# Patient Record
Sex: FEMALE | Race: AMERICAN INDIAN OR ALASKA NATIVE
[De-identification: names, ages, dates, MRNs, and addresses within clinical notes are randomized per-mention and may not be internally consistent; named-entity substitution may affect disease eponyms.]

---
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
## 2018-04-10 ENCOUNTER — HOSPITAL ENCOUNTER (EMERGENCY)
Dept: HOSPITAL 5 - ED | Age: 18
LOS: 1 days | Discharge: HOME | End: 2018-04-11
Payer: MEDICAID

## 2018-04-10 DIAGNOSIS — N76.0: ICD-10-CM

## 2018-04-10 DIAGNOSIS — E86.0: Primary | ICD-10-CM

## 2018-04-10 PROCEDURE — 85025 COMPLETE CBC W/AUTO DIFF WBC: CPT

## 2018-04-10 PROCEDURE — 36415 COLL VENOUS BLD VENIPUNCTURE: CPT

## 2018-04-10 PROCEDURE — 99283 EMERGENCY DEPT VISIT LOW MDM: CPT

## 2018-04-10 PROCEDURE — 96372 THER/PROPH/DIAG INJ SC/IM: CPT

## 2018-04-10 PROCEDURE — 81001 URINALYSIS AUTO W/SCOPE: CPT

## 2018-04-10 PROCEDURE — 84703 CHORIONIC GONADOTROPIN ASSAY: CPT

## 2018-04-10 PROCEDURE — 80053 COMPREHEN METABOLIC PANEL: CPT

## 2018-04-11 VITALS — DIASTOLIC BLOOD PRESSURE: 47 MMHG | SYSTOLIC BLOOD PRESSURE: 125 MMHG

## 2018-04-11 LAB
ALBUMIN SERPL-MCNC: 4.3 G/DL (ref 3.9–5)
ALT SERPL-CCNC: 8 UNITS/L (ref 7–56)
BASOPHILS # (AUTO): 0 K/MM3 (ref 0–0.1)
BASOPHILS NFR BLD AUTO: 0.5 % (ref 0–1.8)
BILIRUB UR QL STRIP: (no result)
BLOOD UR QL VISUAL: (no result)
BUN SERPL-MCNC: 11 MG/DL (ref 7–17)
BUN/CREAT SERPL: 18 %
CALCIUM SERPL-MCNC: 9.2 MG/DL (ref 8.4–10.2)
EOSINOPHIL # BLD AUTO: 0.1 K/MM3 (ref 0–0.4)
EOSINOPHIL NFR BLD AUTO: 0.8 % (ref 0–4.3)
HCT VFR BLD CALC: 40.9 % (ref 30.3–42.9)
HEMOLYSIS INDEX: 4
HGB BLD-MCNC: 13.6 GM/DL (ref 10.1–14.3)
LYMPHOCYTES # BLD AUTO: 1.5 K/MM3 (ref 1.2–5.4)
LYMPHOCYTES NFR BLD AUTO: 19.2 % (ref 13.4–35)
MCH RBC QN AUTO: 29 PG (ref 28–32)
MCHC RBC AUTO-ENTMCNC: 33 % (ref 30–34)
MCV RBC AUTO: 88 FL (ref 79–97)
MONOCYTES # (AUTO): 0.7 K/MM3 (ref 0–0.8)
MONOCYTES % (AUTO): 8.9 % (ref 0–7.3)
PH UR STRIP: 6 [PH] (ref 5–7)
PLATELET # BLD: 219 K/MM3 (ref 140–440)
PROT UR STRIP-MCNC: (no result) MG/DL
RBC # BLD AUTO: 4.65 M/MM3 (ref 3.65–5.03)
RBC #/AREA URNS HPF: 3 /HPF (ref 0–6)
UROBILINOGEN UR-MCNC: < 2 MG/DL (ref ?–2)
WBC #/AREA URNS HPF: 1 /HPF (ref 0–6)

## 2018-04-11 NOTE — EMERGENCY DEPARTMENT REPORT
HPI





- General


Chief Complaint: Abdominal Pain


Time Seen by Provider: 04/11/18 01:40





- HPI


HPI: 








The patient is a 17-year-old female presents for evaluation of abdominal pain.  

The patient reports lower abdominal pain for 2-3 months, worsened for the past 

week, moderate in severity, crampy in quality, exacerbated with position 

changes.  She admits to chronic vaginal discharge and vaginal irritation for 

many months. The patient denies fever, chills, night sweats, nausea, vomiting, 

diarrhea, blood in the stool, dark tarry stool, dysuria, hematuria, flank pain, 

vaginal bleeding, inability to pass flatus. 








ED Past Medical Hx





- Past Medical History


Previous Medical History?: Yes





- Surgical History


Past Surgical History?: No





- Social History


Smoking Status: Never Smoker


Substance Use Type: None





- Medications


Home Medications: 


 Home Medications











 Medication  Instructions  Recorded  Confirmed  Last Taken  Type


 


metroNIDAZOLE [Flagyl] 500 mg PO Q12HR #14 tab 06/22/17  Unknown Rx


 


Ibuprofen [Motrin] 800 mg PO Q8HR PRN #15 tablet 04/11/18  Unknown Rx


 


metroNIDAZOLE [Flagyl] 500 mg PO Q12HR #14 tab 04/11/18  Unknown Rx














ED Review of Systems


ROS: 


Stated complaint: ABD PAIN


Other details as noted in HPI








Constitutional: denies: fever


ENT: denies: throat or neck pain


Respiratory: denies: cough, shortness of breath


Cardiovascular: denies: chest pain


Endocrine: denies unexplained weight loss or gain


Gastrointestinal: reports abdominal pain denies: nausea


Genitourinary: reports vaginal discharge denies: dysuria


Musculoskeletal: denies: leg swelling


Skin: denies: rash


Neurological: denies: headache


Hematological/Lymphatic: denies: easy bleeding or easy bruising  


Psych: denies sadness or hopelessness








Physical Exam





- Physical Exam


Vital Signs: 


 Vital Signs











  04/10/18 04/11/18 04/11/18





  23:39 00:06 00:34


 


Temperature 98.8 F 98.8 F 


 


Pulse Rate 98 H 98 H 


 


Respiratory 18 18 





Rate   


 


Blood Pressure 140/84 140/84 123/78


 


O2 Sat by Pulse 99 99 





Oximetry   














  04/11/18 04/11/18 04/11/18





  00:45 01:00 01:16


 


Temperature 98.9 F  


 


Pulse Rate   


 


Respiratory   





Rate   


 


Blood Pressure 123/78 123/78 110/79


 


O2 Sat by Pulse 100 100 98





Oximetry   














  04/11/18 04/11/18 04/11/18





  01:30 01:46 02:00


 


Temperature   


 


Pulse Rate   


 


Respiratory   





Rate   


 


Blood Pressure 121/67 121/67 115/75


 


O2 Sat by Pulse 100 98 98





Oximetry   














  04/11/18 04/11/18 04/11/18





  02:16 02:30 02:46


 


Temperature   


 


Pulse Rate   


 


Respiratory   





Rate   


 


Blood Pressure 115/75 123/73 123/73


 


O2 Sat by Pulse 98 96 84





Oximetry   














  04/11/18





  03:00


 


Temperature 


 


Pulse Rate 


 


Respiratory 





Rate 


 


Blood Pressure 125/47


 


O2 Sat by Pulse 98





Oximetry 











Physical Exam: 








General: well-nourished, well-developed, no acute distress


Head: Normocephalic, atraumatic


Eyes: normal sclera


ENT: Mucous membranes are pale and dry


Neck: trachea midline, neck supple, No neck stiffness, no cervical adenopathy


Respiratory: Breath sounds equal bilaterally, no wheezing, rales, or rhonchi


Cardio: S1 and S2 present, no murmurs, rubs, gallops, capillary refill is 

delayed


Abdomen: Normoactive bowel sounds, soft abdomen, suprapubic and periumbilical 

tenderness to palpation present, no rigidity, no guarding or rebound tenderness


Chest WALL/Back: No tenderness to palpation of the chest wall, no CVA 

tenderness with percussion


Musc: No pitting edema


Skin: No rash


Neuro: no facial drooping, normal speech


Psych: Normal affect








ED Course


 Vital Signs











  04/10/18 04/11/18 04/11/18





  23:39 00:06 00:34


 


Temperature 98.8 F 98.8 F 


 


Pulse Rate 98 H 98 H 


 


Respiratory 18 18 





Rate   


 


Blood Pressure 140/84 140/84 123/78


 


O2 Sat by Pulse 99 99 





Oximetry   














  04/11/18 04/11/18 04/11/18





  00:45 01:00 01:16


 


Temperature 98.9 F  


 


Pulse Rate   


 


Respiratory   





Rate   


 


Blood Pressure 123/78 123/78 110/79


 


O2 Sat by Pulse 100 100 98





Oximetry   














  04/11/18 04/11/18 04/11/18





  01:30 01:46 02:00


 


Temperature   


 


Pulse Rate   


 


Respiratory   





Rate   


 


Blood Pressure 121/67 121/67 115/75


 


O2 Sat by Pulse 100 98 98





Oximetry   














  04/11/18 04/11/18 04/11/18





  02:16 02:30 02:46


 


Temperature   


 


Pulse Rate   


 


Respiratory   





Rate   


 


Blood Pressure 115/75 123/73 123/73


 


O2 Sat by Pulse 98 96 84





Oximetry   














  04/11/18





  03:00


 


Temperature 


 


Pulse Rate 


 


Respiratory 





Rate 


 


Blood Pressure 125/47


 


O2 Sat by Pulse 98





Oximetry 














ED Medical Decision Making





- Lab Data


Result diagrams: 


 04/11/18 00:16





 04/11/18 00:16





- Medical Decision Making





The patient was seen and examined by myself.  The patient is placed on a 

cardiac monitor and continuous pulse ox. On initial evaluation, the patient was 

found to be in no distress.  Evaluation orders are placed.  The patient was 

given pain medicine.  Lab results were non-concerning including WBC, hemoglobin

, hematocrit, electrolytes, renal function, LFTs, lipase, and urinalysis.  

Negative pregnancy test.  Medical records are reviewed and revealed that the 

patient was found to have a positive culture for gonorrhea.  She was questioned 

regarding the positive result and reported that she was never informed of the 

positive result and was never treated for gonorrhea or chlamydia.  As patient 

has continued to experience vaginal discharge and irritation chronically, and 

was never treated for cervicitis,  the patient will be treated for cervicitis 

and vaginitis.  The patient was given IM Rocephin, and by mouth azithromycin 

and Flagyl. The patient was reevaluated and reported that their symptoms were 

markedly improved.  The patient is stable for discharge with outpatient follow-

up.  The patient is given follow-up and return instructions.  The patient 

expressed understanding and agreed with the plan.  The patient is discharged in 

stable condition.


Critical care attestation.: 


If time is entered above; I have spent that time in minutes in the direct care 

of this critically ill patient, excluding procedure time.








ED Disposition


Clinical Impression: 


 Abdominal pain, acute, periumbilical, Dehydration, mild





Vaginitis


Qualifiers:


 Chronicity: acute Qualified Code(s): N76.0 - Acute vaginitis





Disposition: DC-01 TO HOME OR SELFCARE


Is pt being admited?: No


Does the pt Need Aspirin: No


Condition: Stable


Instructions:  Abdominal Pain (ED), Sexually Transmitted Diseases (ED), Safe 

Sex (ED), Trichomoniasis (ED), Gonococcal Urethritis (ED)


Referrals: 


MANUELA PATEL MD [Staff Physician] - 3-5 Days


[facility] [Provider Group] - 3-5 Days


Time of Disposition: 04:30

## 2018-08-06 ENCOUNTER — HOSPITAL ENCOUNTER (EMERGENCY)
Dept: HOSPITAL 5 - ED | Age: 18
LOS: 1 days | Discharge: HOME | End: 2018-08-07
Payer: MEDICAID

## 2018-08-06 VITALS — DIASTOLIC BLOOD PRESSURE: 82 MMHG | SYSTOLIC BLOOD PRESSURE: 117 MMHG

## 2018-08-06 DIAGNOSIS — L30.9: Primary | ICD-10-CM

## 2018-08-06 DIAGNOSIS — N30.00: ICD-10-CM

## 2018-08-06 DIAGNOSIS — N76.0: ICD-10-CM

## 2018-08-06 PROCEDURE — 99283 EMERGENCY DEPT VISIT LOW MDM: CPT

## 2018-08-06 PROCEDURE — 81001 URINALYSIS AUTO W/SCOPE: CPT

## 2018-08-07 LAB
BACTERIA #/AREA URNS HPF: (no result) /HPF
BILIRUB UR QL STRIP: (no result)
BLOOD UR QL VISUAL: (no result)
MUCOUS THREADS #/AREA URNS HPF: (no result) /HPF
PH UR STRIP: 6 [PH] (ref 5–7)
PROT UR STRIP-MCNC: (no result) MG/DL
RBC #/AREA URNS HPF: 17 /HPF (ref 0–6)
UROBILINOGEN UR-MCNC: 2 MG/DL (ref ?–2)
WBC #/AREA URNS HPF: 111 /HPF (ref 0–6)

## 2018-08-07 NOTE — EMERGENCY DEPARTMENT REPORT
ED General Adult HPI





- General


Chief complaint: Skin Rash


Stated complaint: VAG BURNING SENSATION


Time Seen by Provider: 18 23:10


Source: patient


Mode of arrival: Ambulatory


Limitations: No Limitations





- History of Present Illness


Initial comments: 


Patient herniorrhaphy female who presents with dysuria urgency frequency after 

shaving perineal area patient denies that vaginal discharge no vaginal bleeding 

no back pain abdominal pain no nausea vomiting denies risk of STI symptoms are 4

/10 level exacerbated by voiding, relieved by cold shower


Onset/Timin


-: days(s)


Radiation: non-radiation


Severity scale (0 -10): 4


Quality: burning, other (itching)


Consistency: intermittent


Improves with: none


Worsens with: other (voiding )


Associated Symptoms: denies: confusion, chest pain, cough, diaphoresis, fever/

chills, headaches, loss of appetite, malaise, nausea/vomiting, rash, seizure, 

shortness of breath, syncope, weakness





- Related Data


 Previous Rx's











 Medication  Instructions  Recorded  Last Taken  Type


 


metroNIDAZOLE [Flagyl] 500 mg PO Q12HR #14 tab 17 Unknown Rx


 


Ibuprofen [Motrin] 800 mg PO Q8HR PRN #15 tablet 18 Unknown Rx


 


metroNIDAZOLE [Flagyl] 500 mg PO Q12HR #14 tab 18 Unknown Rx


 


Fluconazole [Diflucan TAB] 150 mg PO ONCE #1 tablet 18 Unknown Rx


 


Mupirocin [Bactroban 2% OINT] 1 applic TP TID #1 tube 18 Unknown Rx


 


Nitrofurantoin Monohyd/M-Cryst 100 mg PO BID #14 capsule 18 Unknown Rx





[Macrobid 100 mg Capsule]    











 Allergies











Allergy/AdvReac Type Severity Reaction Status Date / Time


 


No Known Allergies Allergy   Unverified 17 13:41














ED Review of Systems


ROS: 


Stated complaint: VAG BURNING SENSATION


Other details as noted in HPI





Constitutional: denies: chills, fever


Eyes: denies: eye pain, eye discharge, vision change


ENT: denies: ear pain, throat pain


Respiratory: denies: cough, shortness of breath, wheezing


Cardiovascular: denies: chest pain, palpitations


Endocrine: no symptoms reported


Gastrointestinal: denies: abdominal pain, nausea, diarrhea


Genitourinary: urgency, dysuria, frequency.  denies: hematuria, discharge, 

abnormal menses, dyspareunia


Musculoskeletal: denies: back pain, joint swelling, arthralgia


Skin: rash (perineal "razor bumps").  denies: lesions


Neurological: denies: headache, weakness, paresthesias


Psychiatric: denies: anxiety, depression


Hematological/Lymphatic: denies: easy bleeding, easy bruising





ED Past Medical Hx





- Past Medical History


Previous Medical History?: No





- Surgical History


Past Surgical History?: No





- Social History


Smoking Status: Never Smoker


Substance Use Type: None





- Medications


Home Medications: 


 Home Medications











 Medication  Instructions  Recorded  Confirmed  Last Taken  Type


 


metroNIDAZOLE [Flagyl] 500 mg PO Q12HR #14 tab 17  Unknown Rx


 


Ibuprofen [Motrin] 800 mg PO Q8HR PRN #15 tablet 18  Unknown Rx


 


metroNIDAZOLE [Flagyl] 500 mg PO Q12HR #14 tab 18  Unknown Rx


 


Fluconazole [Diflucan TAB] 150 mg PO ONCE #1 tablet 18  Unknown Rx


 


Mupirocin [Bactroban 2% OINT] 1 applic TP TID #1 tube 18  Unknown Rx


 


Nitrofurantoin Monohyd/M-Cryst 100 mg PO BID #14 capsule 18  Unknown Rx





[Macrobid 100 mg Capsule]     














ED Physical Exam





- General


Limitations: No Limitations


General appearance: alert, in no apparent distress





- Head


Head exam: Present: atraumatic, normocephalic





- Eye


Eye exam: Present: normal appearance





- ENT


ENT exam: Present: mucous membranes moist





- Neck


Neck exam: Present: normal inspection





- Respiratory


Respiratory exam: Present: normal lung sounds bilaterally.  Absent: respiratory 

distress





- Cardiovascular


Cardiovascular Exam: Present: regular rate, normal rhythm.  Absent: systolic 

murmur, diastolic murmur, rubs, gallop





- GI/Abdominal


GI/Abdominal exam: Present: soft, normal bowel sounds





- Rectal


Rectal exam: Present: deferred





- 


External exam: Present: other (exam deferred per patient )





- Extremities Exam


Extremities exam: Present: normal inspection, full ROM.  Absent: tenderness





- Back Exam


Back exam: Present: normal inspection, full ROM, rash noted.  Absent: tenderness

, CVA tenderness (R), CVA tenderness (L), muscle spasm, paraspinal tenderness, 

vertebral tenderness





- Neurological Exam


Neurological exam: Present: alert, oriented X3, CN II-XII intact, normal gait, 

reflexes normal





- Psychiatric


Psychiatric exam: Present: normal affect, normal mood





- Skin


Skin exam: Present: warm, dry, intact, normal color.  Absent: rash





ED Course





 Vital Signs











  18





  21:20


 


Temperature 98.2 F


 


Pulse Rate 88


 


Respiratory 18





Rate 


 


Blood Pressure 117/82


 


O2 Sat by Pulse 100





Oximetry 














ED Medical Decision Making





- Lab Data








 Laboratory Tests











  18





  00:01


 


Urine Color  Yellow


 


Urine Turbidity  Slightly cloudy


 


Urine pH  6.0


 


Ur Specific Gravity  1.017


 


Urine Protein  <15 mg/dl


 


Urine Glucose (UA)  Neg


 


Urine Ketones  Neg


 


Urine Blood  Lg


 


Urine Nitrite  Neg


 


Urine Bilirubin  Neg


 


Urine Urobilinogen  2.0


 


Ur Leukocyte Esterase  Lg


 


Urine WBC (Auto)  111.0 H


 


Urine RBC (Auto)  17.0


 


U Epithel Cells (Auto)  5.0


 


Urine Bacteria (Auto)  1+


 


Urine Mucus  Few














- Medical Decision Making


 This is UTI patient denies hematuria no voiding problems of back pain no CVA 

tenderness no history of renal stone not likely stone symptoms started after 

perineal shaving Plan Macrobid Diflucan mupirocin ointment  [facility] days return to ED if symptoms worsen as well as 

understanding and agreement was signed DC'd home stable condition at this time 


Critical care attestation.: 


If time is entered above; I have spent that time in minutes in the direct care 

of this critically ill patient, excluding procedure time.








ED Disposition


Clinical Impression: 


 Dermatitis





UTI (urinary tract infection)


Qualifiers:


 Urinary tract infection type: acute cystitis Hematuria presence: without 

hematuria Qualified Code(s): N30.00 - Acute cystitis without hematuria





Vaginitis


Qualifiers:


 Chronicity: acute Qualified Code(s): N76.0 - Acute vaginitis





Disposition: DC- TO HOME OR SELFCARE


Is pt being admited?: No


Does the pt Need Aspirin: No


Condition: Good


Instructions:  Urinary Tract Infection in Women (ED), Vaginitis (ED), Contact 

Dermatitis (ED)


Prescriptions: 


Fluconazole [Diflucan TAB] 150 mg PO ONCE #1 tablet


Mupirocin [Bactroban 2% OINT] 1 applic TP TID #1 tube


Nitrofurantoin Monohyd/M-Cryst [Macrobid 100 mg Capsule] 100 mg PO BID #14 

capsule


Referrals: 


PRIMARY CARE,MD [Primary Care Provider] - 3-5 Days


Forms:  Work/School Release Form(ED)


Time of Disposition: 01:03